# Patient Record
Sex: MALE | URBAN - METROPOLITAN AREA
[De-identification: names, ages, dates, MRNs, and addresses within clinical notes are randomized per-mention and may not be internally consistent; named-entity substitution may affect disease eponyms.]

---

## 2023-12-19 ENCOUNTER — TELEPHONE (OUTPATIENT)
Dept: TRANSPLANT | Facility: CLINIC | Age: 48
End: 2023-12-19

## 2025-01-28 ENCOUNTER — TELEPHONE (OUTPATIENT)
Dept: TRANSPLANT | Facility: CLINIC | Age: 50
End: 2025-01-28

## 2025-02-03 ENCOUNTER — TELEPHONE (OUTPATIENT)
Dept: TRANSPLANT | Facility: CLINIC | Age: 50
End: 2025-02-03
Payer: COMMERCIAL

## 2025-02-19 ENCOUNTER — TELEPHONE (OUTPATIENT)
Dept: TRANSPLANT | Facility: CLINIC | Age: 50
End: 2025-02-19
Payer: COMMERCIAL

## 2025-02-19 DIAGNOSIS — Z76.82 ORGAN TRANSPLANT CANDIDATE: ICD-10-CM

## 2025-02-19 DIAGNOSIS — N18.6 END STAGE RENAL DISEASE: Primary | ICD-10-CM

## 2025-03-07 ENCOUNTER — TELEPHONE (OUTPATIENT)
Dept: TRANSPLANT | Facility: CLINIC | Age: 50
End: 2025-03-07
Payer: COMMERCIAL

## 2025-03-12 ENCOUNTER — TELEPHONE (OUTPATIENT)
Dept: TRANSPLANT | Facility: CLINIC | Age: 50
End: 2025-03-12
Payer: COMMERCIAL

## 2025-03-26 ENCOUNTER — HOSPITAL ENCOUNTER (OUTPATIENT)
Dept: RADIOLOGY | Facility: HOSPITAL | Age: 50
Discharge: HOME | End: 2025-03-26
Attending: NURSE PRACTITIONER
Payer: MEDICARE

## 2025-03-26 ENCOUNTER — TELEPHONE (OUTPATIENT)
Dept: TRANSPLANT | Facility: CLINIC | Age: 50
End: 2025-03-26
Payer: COMMERCIAL

## 2025-03-26 ENCOUNTER — HOSPITAL ENCOUNTER (OUTPATIENT)
Dept: RADIOLOGY | Facility: HOSPITAL | Age: 50
Discharge: HOME | End: 2025-03-26
Payer: MEDICARE

## 2025-03-26 ENCOUNTER — OFFICE VISIT (OUTPATIENT)
Dept: TRANSPLANT | Facility: CLINIC | Age: 50
End: 2025-03-26
Payer: MEDICARE

## 2025-03-26 VITALS
BODY MASS INDEX: 38.91 KG/M2 | SYSTOLIC BLOOD PRESSURE: 157 MMHG | HEART RATE: 70 BPM | WEIGHT: 271.81 LBS | TEMPERATURE: 98 F | RESPIRATION RATE: 20 BRPM | OXYGEN SATURATION: 97 % | HEIGHT: 70 IN | DIASTOLIC BLOOD PRESSURE: 74 MMHG

## 2025-03-26 DIAGNOSIS — I25.10 CORONARY ARTERY DISEASE INVOLVING NATIVE CORONARY ARTERY OF NATIVE HEART WITHOUT ANGINA PECTORIS: ICD-10-CM

## 2025-03-26 DIAGNOSIS — N18.6 HYPERPARATHYROIDISM DUE TO ESRD ON DIALYSIS: ICD-10-CM

## 2025-03-26 DIAGNOSIS — Z99.2 ESRD ON HEMODIALYSIS: ICD-10-CM

## 2025-03-26 DIAGNOSIS — N25.81 HYPERPARATHYROIDISM DUE TO ESRD ON DIALYSIS: ICD-10-CM

## 2025-03-26 DIAGNOSIS — E66.01 CLASS 2 SEVERE OBESITY DUE TO EXCESS CALORIES WITH SERIOUS COMORBIDITY AND BODY MASS INDEX (BMI) OF 39.0 TO 39.9 IN ADULT: ICD-10-CM

## 2025-03-26 DIAGNOSIS — Z01.818 PRE-TRANSPLANT EVALUATION FOR KIDNEY TRANSPLANT: Primary | ICD-10-CM

## 2025-03-26 DIAGNOSIS — R80.9 NEPHROTIC RANGE PROTEINURIA: ICD-10-CM

## 2025-03-26 DIAGNOSIS — N18.6 TYPE 2 DM WITH HYPERTENSION AND ESRD ON DIALYSIS: ICD-10-CM

## 2025-03-26 DIAGNOSIS — Z86.73 HISTORY OF CVA (CEREBROVASCULAR ACCIDENT): ICD-10-CM

## 2025-03-26 DIAGNOSIS — Z71.85 IMMUNIZATION COUNSELING: ICD-10-CM

## 2025-03-26 DIAGNOSIS — E66.812 CLASS 2 SEVERE OBESITY DUE TO EXCESS CALORIES WITH SERIOUS COMORBIDITY AND BODY MASS INDEX (BMI) OF 39.0 TO 39.9 IN ADULT: ICD-10-CM

## 2025-03-26 DIAGNOSIS — Z99.2 HYPERPARATHYROIDISM DUE TO ESRD ON DIALYSIS: ICD-10-CM

## 2025-03-26 DIAGNOSIS — N18.6 ESRD ON HEMODIALYSIS: ICD-10-CM

## 2025-03-26 DIAGNOSIS — I12.0 TYPE 2 DM WITH HYPERTENSION AND ESRD ON DIALYSIS: ICD-10-CM

## 2025-03-26 DIAGNOSIS — Z99.2 TYPE 2 DM WITH HYPERTENSION AND ESRD ON DIALYSIS: ICD-10-CM

## 2025-03-26 DIAGNOSIS — Z76.82 ORGAN TRANSPLANT CANDIDATE: ICD-10-CM

## 2025-03-26 DIAGNOSIS — N18.6 ANEMIA IN ESRD (END-STAGE RENAL DISEASE): ICD-10-CM

## 2025-03-26 DIAGNOSIS — I73.9 PERIPHERAL VASCULAR DISEASE: ICD-10-CM

## 2025-03-26 DIAGNOSIS — I48.91 ATRIAL FIBRILLATION, UNSPECIFIED TYPE: ICD-10-CM

## 2025-03-26 DIAGNOSIS — F32.A DEPRESSION, UNSPECIFIED DEPRESSION TYPE: ICD-10-CM

## 2025-03-26 DIAGNOSIS — E78.49 OTHER HYPERLIPIDEMIA: ICD-10-CM

## 2025-03-26 DIAGNOSIS — N18.6 END STAGE RENAL DISEASE: ICD-10-CM

## 2025-03-26 DIAGNOSIS — E11.22 TYPE 2 DM WITH HYPERTENSION AND ESRD ON DIALYSIS: ICD-10-CM

## 2025-03-26 DIAGNOSIS — D63.1 ANEMIA IN ESRD (END-STAGE RENAL DISEASE): ICD-10-CM

## 2025-03-26 PROBLEM — G47.33 OBSTRUCTIVE SLEEP APNEA: Status: ACTIVE | Noted: 2023-07-17

## 2025-03-26 PROBLEM — E66.9 OBESITY: Status: ACTIVE | Noted: 2025-03-26

## 2025-03-26 PROCEDURE — 93978 VASCULAR STUDY: CPT | Mod: TC,TXP

## 2025-03-26 PROCEDURE — 99204 OFFICE O/P NEW MOD 45 MIN: CPT | Mod: S$PBB,TXP,, | Performed by: STUDENT IN AN ORGANIZED HEALTH CARE EDUCATION/TRAINING PROGRAM

## 2025-03-26 PROCEDURE — 99204 OFFICE O/P NEW MOD 45 MIN: CPT | Mod: S$PBB,TXP,, | Performed by: SURGERY

## 2025-03-26 PROCEDURE — 99999 PR PBB SHADOW E&M-EST. PATIENT-LVL V: CPT | Mod: PBBFAC,TXP,, | Performed by: NURSE PRACTITIONER

## 2025-03-26 PROCEDURE — 72192 CT PELVIS W/O DYE: CPT | Mod: TC,TXP

## 2025-03-26 PROCEDURE — 93978 VASCULAR STUDY: CPT | Mod: 26,TXP,, | Performed by: STUDENT IN AN ORGANIZED HEALTH CARE EDUCATION/TRAINING PROGRAM

## 2025-03-26 PROCEDURE — 71046 X-RAY EXAM CHEST 2 VIEWS: CPT | Mod: 26,TXP,, | Performed by: RADIOLOGY

## 2025-03-26 PROCEDURE — 76770 US EXAM ABDO BACK WALL COMP: CPT | Mod: 26,TXP,, | Performed by: STUDENT IN AN ORGANIZED HEALTH CARE EDUCATION/TRAINING PROGRAM

## 2025-03-26 PROCEDURE — 71046 X-RAY EXAM CHEST 2 VIEWS: CPT | Mod: TC,TXP

## 2025-03-26 PROCEDURE — 76770 US EXAM ABDO BACK WALL COMP: CPT | Mod: TC,TXP

## 2025-03-26 PROCEDURE — 99215 OFFICE O/P EST HI 40 MIN: CPT | Mod: PBBFAC,25,TXP | Performed by: NURSE PRACTITIONER

## 2025-03-26 PROCEDURE — 72192 CT PELVIS W/O DYE: CPT | Mod: 26,TXP,, | Performed by: INTERNAL MEDICINE

## 2025-03-26 RX ORDER — SEVELAMER CARBONATE 800 MG/1
800 TABLET, FILM COATED ORAL
COMMUNITY

## 2025-03-26 RX ORDER — DAPAGLIFLOZIN 10 MG/1
1 TABLET, FILM COATED ORAL DAILY
COMMUNITY

## 2025-03-26 RX ORDER — DILTIAZEM HYDROCHLORIDE 240 MG/1
240 CAPSULE, COATED, EXTENDED RELEASE ORAL DAILY
COMMUNITY

## 2025-03-26 RX ORDER — TAMSULOSIN HYDROCHLORIDE 0.4 MG/1
0.4 CAPSULE ORAL DAILY
COMMUNITY

## 2025-03-26 RX ORDER — TIRZEPATIDE 2.5 MG/.5ML
2.5 INJECTION, SOLUTION SUBCUTANEOUS
COMMUNITY
Start: 2024-11-25

## 2025-03-26 RX ORDER — OMEPRAZOLE 20 MG/1
20 CAPSULE, DELAYED RELEASE ORAL DAILY
COMMUNITY

## 2025-03-26 RX ORDER — APIXABAN 2.5 MG/1
2.5 TABLET, FILM COATED ORAL 2 TIMES DAILY
COMMUNITY
Start: 2024-08-15

## 2025-03-26 RX ORDER — HYDROCODONE BITARTRATE AND ACETAMINOPHEN 7.5; 325 MG/1; MG/1
1 TABLET ORAL EVERY 8 HOURS PRN
COMMUNITY
Start: 2025-03-10

## 2025-03-26 RX ORDER — ISOSORBIDE MONONITRATE 120 MG/1
1 TABLET, EXTENDED RELEASE ORAL DAILY
COMMUNITY

## 2025-03-26 RX ORDER — METOPROLOL TARTRATE 25 MG/1
25 TABLET, FILM COATED ORAL 2 TIMES DAILY
COMMUNITY

## 2025-03-26 RX ORDER — ASPIRIN 81 MG/1
81 TABLET ORAL ONCE
COMMUNITY

## 2025-03-26 RX ORDER — HYDRALAZINE HYDROCHLORIDE 100 MG/1
1 TABLET, FILM COATED ORAL 3 TIMES DAILY
COMMUNITY

## 2025-03-26 NOTE — TELEPHONE ENCOUNTER
MA notes per Adherence form     FOR THE PAST THREE MONTHS:    2-AMA's 25 130 min unknown 25 66 min    0-No-shows    No concerns with care giving, transportation, or mental health    Per adherence form pt rarely misses treatments February was an exception for the patient. Patient is independent and lives alone. Patients adult daughter would be his caregiver if needed.     Scanned in pt's media    Bonnie Fitzgerald  Abdominal Transplant MA

## 2025-03-26 NOTE — PROGRESS NOTES
Transplant Recipient Adult Psychosocial Assessment    Mamadou Ruiz  [address] [facility] [place] [address]  Telephone Information:   Mobile [phone]   Home  [phone] (home)  Work  There is no work phone number on file.  E-mail  [email or web address]    Sex: male  YOB: 1975  Age: 49 y.o.    Encounter Date: 3/26/2025  [place] Citizen: yes  Primary Language: English   Needed: no    Emergency Contact:  Name: Andreea Riuz   Relationship: daughter  Address: [id number] [place] [address] [address]   Phone Numbers:  [phone] (mobile)    Family/Social Support:   Number of dependents/: The patient reported that he does not have any dependents at this time.   Marital history: The patient reported he is a .The patient reported that he loss his wife in 2023.    Other family dynamics: The patient reported that his mother is living and will be able to assist him post transplant recovery.     Household Composition:    The patient reported that he lives alone at this time.     Do you and your caregivers have access to reliable transportation? yes  PRIMARY CAREGIVER: Andreea Ruiz (daughter- 29 y/o)  will be primary caregiver, phone number [phone].      provided in-depth information to patient and caregiver regarding pre- and post-transplant caregiver role.   strongly encourages patient and caregiver to have concrete plan regarding post-transplant care giving, including back-up caregiver(s) to ensure care giving needs are met as needed.    Patient and Caregiver states understanding all aspects of caregiver role/commitment and is able/willing/committed to being caregiver to the fullest extent necessary.    Patient and Caregiver verbalizes understanding of the education provided today and caregiver responsibilities.         remains available. Patient and Caregiver agree to contact  in a timely manner if  concerns arise.      Able to take time off work without financial concerns: yes.     Additional Significant Others who will Assist with Transplant:  Name: Pina Ruiz   Age: 69  City: [facility] State: [place]  Relationship: mother  Does person drive? yes        Living Will: yes- Andreea Sara  Healthcare Power of : yes- Destiness Sara  Advance Directives on file: <<no information> per medical record.  Verbally reviewed LW/HCPA information.   provided patient with copy of LW/HCPA documents and provided education on completion of forms.    Living Donors: No.    Highest Education Level: Associate/Bachelor Degree  Reading Ability: college  Reports difficulty with: N/A  Learns Best By:  Hands-on      Status: no  VA Benefits: no     Working for Income: No  If no, reason not working: Demands of Treatment  The patient reported that he is disabled. The patient reported that the date disability started was 2023 due to him experiencing a stoke.     Spouse/Significant Other Employment: none    Disabled: yes: date disability began: 2023, due to: Stroke.    Monthly Income:   Disability: $2,900  Able to afford all costs now and if transplanted, including medications: yes  Patient and Caregiver verbalizes understanding of personal responsibilities related to transplant costs and the importance of having a financial plan to ensure that patients transplant costs are fully covered.      provided fundraising information/education.  Patient and Caregiver verbalizes understanding.   remains available.    Insurance:   Payer/Plan Subscr  Sex Relation Sub. Ins. ID Effective Group Num   1. BLUE CROSS BL* RICK RUIZ 1975 Male Self [id number]                                   [address] [address]   2. [place]ARE - [place]* RICK RUIZ 1975 Male Self [id number]                                    [address]     Primary Insurance (for UNOS  reporting): Public Insurance - Medicare & Choice  Secondary Insurance (for UNOS reporting): Public Insurance - Medicare & Choice  Patient and Caregiver verbalizes clear understanding that patient may experience difficulty obtaining and/or be denied insurance coverage post-surgery. This includes and is not limited to disability insurance, life insurance, health insurance, burial insurance, long term care insurance, and other insurances.    Patient and Caregiver also reports understanding that future health concerns related to or unrelated to transplantation may not be covered by patient's insurance.  Resources and information provided and reviewed.      Patient and Caregiver provides verbal permission to release any necessary information to outside resources for patient care and discharge planning.  Resources and information provided are reviewed.      Dialysis Adherence:  The patient reported that he has been adhering to his dialysis as scheduled. Compliance form sent to dialysis unit on 3-7-2025 and awaiting a response.       Infusion Service: patient utilizing? no  Home Health: patient utilizing? no  DME: yes- BPC, CPAP, Cane, and thermometer   Pulmonary/Cardiac Rehab: no   ADLS:  The patient reported that he is able to complete his activities of daily living independently.    Adherence:   The patient reported that he adheres to his medication regiment and follow the medical instructions given to him by his physicians..  Adherence education and counseling provided.     Per History Section:  Past Medical History:   Diagnosis Date    Anemia     Atrial fibrillation     CHF (congestive heart failure)     CVA (cerebral vascular accident)     Diabetes mellitus, type 2     Disorder of kidney and ureter     GERD (gastroesophageal reflux disease)     Hyperlipidemia     Hypertension     Obesity     PVD (peripheral vascular disease)      Social History     Tobacco Use    Smoking status: Former     Current packs/day: 0.00      Types: Cigarettes     Quit date: 2015     Years since quitting: 10.2    Smokeless tobacco: Never   Substance Use Topics    Alcohol use: Not on file     Social History     Substance and Sexual Activity   Drug Use Not on file     Social History     Substance and Sexual Activity   Sexual Activity Not on file       Per Today's Psychosocial:  Tobacco:  The patient reported that he smokes a cigar once a year.  .  Alcohol: none, patient denies any use.  Illicit Drugs/Non-prescribed Medications: none, patient denies any use.    Patient and Caregiver states clear understanding of the potential impact of substance use as it relates to transplant candidacy and is aware of possible random substance screening.  Substance abstinence/cessation counseling, education and resources provided and reviewed.     Arrests/DWI/Treatment/Rehab: patient denies    Psychiatric History:    Mental Health:  The patient reported that he has never been diagnosed with a mental health diagnoses.     Psychiatrist/Counselor: The patient reported that he has never seen a psychiatrist. The patient reported that he seen a counselor in 2012.  The patient reported that he only seen the counselor three times because he left like it was getting worst with visiting that provider.     Medications:  The patient denies current or past use of psychotropic medication.     Suicide/Homicide Issues: The patient denies current or past homicidal issues. The patient reported that he has felt suicidal twice in his life. The patient reported that the first time was in 2012 when he loss his daughter. The patient reported that the second time was when his wife past. The patient reported that he felt the same way both times. The patient denies current suicidal issues.     Safety at home: The patient reported that he feels safe at home.     Knowledge: Patient and Caregiver states having clear understanding and realistic expectations regarding the potential risks and potential  benefits of organ transplantation and organ donation, agrees to discuss with health care team members and support system members, and to utilize available resources and express questions and/or concerns in order to further facilitate the pt informed decision-making.  Resources and information provided and reviewed.     Patient and Caregiver is aware of [facility]'s affiliation and/or partnership with agencies in home health care, LTAC, SNF, [facility], and other hospitals and clinics.    Understanding: Patient and Caregiver reports having a clear understanding of the many lifetime commitments involved with being a transplant recipient, including costs, compliance, medications, lab work, procedures, appointments, concrete and financial planning, preparedness, timely and appropriate communication of concerns, abstinence (ETOH, tobacco, illicit non-prescribed drugs), adherence to all health care team recommendations, support system and caregiver involvement, appropriate and timely resource utilization and follow-through, mental health counseling as needed/recommended, and patient and caregiver responsibilities.  Social Service Handbook, resources and detailed educational information provided and reviewed.  Educational information provided.    Patient and Caregiver also reports current and expected compliance with health care regime and states having a clear understanding of the importance of compliance.      Patient and Caregiver reports a clear understanding that risks and benefits may be involved with organ transplantation and with organ donation.      Patient and Caregiver also reports clear understanding that psychosocial risk factors may affect patient, and include but are not limited to feelings of depression, generalized anxiety, anxiety regarding dependence on others, post traumatic stress disorder, feelings of guilt and other emotional and/or mental concerns, and/or exacerbation of existing mental health concerns.   Detailed resources provided and discussed.     Patient and Caregiver agrees to access appropriate resources in a timely manner as needed and/or as recommended, and to communicate concerns appropriately.  Patient and Caregiver also reports a clear understanding of treatment options available.      reviewed education, provided additional information, and answered questions.    Feelings or Concerns: The patient denies any concerns at this time.     Coping: Identify Patient & Caregiver Strategies to [place]:   1. In the past - cooking and supportive family    2. Currently & Pre-transplant - cooking and supportive family    3. At the time of surgery - cooking and supportive family    4. During post-Transplant & Recovery Period - cooking and supportive family     Goals: The patient reported that his going is to be able to function better. The patient reported that he has two grandsons that he would like to go play outside with.   Patient referred to Vocational Rehabilitation.    Interview Behavior: Patient and Caregiver presents as alert and oriented x 4, pleasant, calm, communicative, cooperative, and asking and answering questions appropriately. During this assessment, this SW met with the patient and his primary caregiver-daughter Andreea Ruiz.          Transplant Social Work - Candidacy  Assessment/Plan:     Psychosocial Suitability: Patient presents as  low  to medium risk candidate for kidney transplant at this time based on psychosocial risk factors. The patient has a caregiver plan in place.The patient has adequate insurance coverage at this time.     Recommendations/Additional Comments: This SW recommends [facility] psychiatry for organ transplant clearance .  SW recommends that pt conduct fundraising to assist pt with pay for lodging, cost of medications, food, gas, and other transplant related needs. SW recommends that pt remain aware of potential mental health concerns and contact the team if any concerns  arise. SW recommends that pt remain abstinent from tobacco, ETOH, and drug use. SW supports pt's continued dialysis adherence. SW remains available to answer any questions or concerns that arise as the pt moves through the transplant process    Final determination of transplant candidacy will be reviewed and determined by the selection committee    Isiah Strauss LCSW, Kidney Transplant   [facility]

## 2025-03-26 NOTE — PROGRESS NOTES
INITIAL PATIENT EDUCATION NOTE AA    Mr. Mamadou Ruiz was seen in pre-kidney transplant clinic for evaluation for kidney, kidney/pancreas or pancreas only transplant.  The patient attended an individual video education session that discussed/reviewed the following aspects of transplantation: evaluation including diagnostic and laboratory testing,(Chemistries, Hematology, Serologies including HIV and Hepatitis and HLA) required for transplantation and selection committee process, UNOS waitlist management/multiple listings, types of organs offered (KDPI < 85%, KDPI > 85%, PHS risk, DCD, HCV+, HIV+ for HIV+ recipients and enbloc/dual), financial aspects, surgical procedures, dietary instruction pre- and post-transplant, health maintenance pre- and post-transplant, post-transplant hospitalization and outpatient follow-up, potential to participate in a research protocol, and medication management and side effects.  A question and answer session was provided after the presentation.    The patient was seen by all members of the multi-disciplinary team to include: Nephrologist/MARYANN, Surgeon, , Transplant Coordinator, , Pharmacist and Dietician (if applicable).    The patient reviewed and signed all consents for evaluation which were witnessed and sent to scanning into the [facility] chart.    The patient was given an education book and plan for further evaluation based on his individual assessment.      The Patient was educated on OPTN policy change regarding race based eGFR. For Black or  Americans, this eGFR could have shown that their kidneys were working better than they were.    Because of this change, we are looking at everyones record and assessing waiting time for people who are eligible. We will be reviewing your medical records and will notify you if you are eligible. We also encouraged patient to provide span 20 labs that are not in our electronic medical records    Reviewed  program requirement for complete COVID vaccination with documentation prior to listing.  COVID education information reviewed with patient. Patient encouraged to be up to date on all vaccinations.     The patient was informed that the transplant team would manage immediate post op pain. If the patient requires long term pain management, they will need to have that pain management addressed by their PCP or previous provider who wrote for long term pain medicines.    The patient was encouraged to call with any questions or concerns.

## 2025-03-26 NOTE — PROGRESS NOTES
"   Transplant Nephrology  Kidney Transplant Recipient Evaluation    Referring Physician: Tish Holcomb  Current Nephrologist: Tish Holcomb    Subjective:   CC:  Initial evaluation of kidney transplant candidacy.    HPI:  Mr. Ruiz is a 49 y.o. year old Black or  male who has presented to be evaluated as a potential kidney transplant recipient.  He has ESRD secondary to diabetic nephropathy.  Patient is currently on hemodialysis started on 4/6/24. Patient is dialyzing on T, TH, Sat.  Patient reports that he is tolerating dialysis well.. He has a LUE AV fistula for dialysis access.     Previous Transplant: no  Donor--may have a donor     Body mass index is 39.4 kg/m².  Encourage lifestyle modifications: diet, exercise, weight loss   Needs to maintain acceptable BMI for txp/guidelines     Cardiac HX:  HTN, HLD, CHF, afib., NONISCHEMIC CARDIOMYOPATHY   Has been on Eliquis since having stroke    HX CVA  Residual -mild-moderate left sided weakness    Fx assessment: Uses a cane to walk . He walked from the garage to the clinic without problems, does not appear frail.  Pt reports having falls d/t BP dropping . Last fall ~ 2/2025 --which he describes as "blacking out"  He reports falling and breaking left first finger  He has been instructed to hold BP meds if systolic in< 150   He has a cardiology apt on 4/17/25 with Dr Walker in [place]     DM 2  Dx ~2007  Meds mounjaro  Complications:  + peripheral neuropathy and retinopathy  PVD, LEFT TOE amputation X2   PVD: LLE DOPPLER 6/19/23  SUSPECTED STENOSIS IN THE REGION OF THE DISTAL SUPERFICIAL FEMORAL ARTERY AND POPLITEAL ARTERY   -NOTED TO HAVE MODERATE TO SEVERE PVD DISTAL TO THE POPLITEAL ARTERY  Lab Results   Component Value Date    HGBA1C 8.9 (H) 03/26/2025         Past Medical and Surgical History: Mr. Ruiz  has a past medical history of Anemia, Atrial fibrillation, CHF (congestive heart failure), CVA (cerebral vascular accident), Diabetes " "mellitus, type 2, Disorder of kidney and ureter, GERD (gastroesophageal reflux disease), Hyperlipidemia, Hypertension, Obesity, and PVD (peripheral vascular disease).  He has a past surgical history that includes Adenoidectomy and Toe amputation (Left).    Past Social and Family History: Mr. Ruiz reports that he quit smoking about 10 years ago. His smoking use included cigarettes. He has never used smokeless tobacco. His family history includes Diabetes in his father and sister; Hypertension in his father; Stroke in his father and sister.    Review of Systems   Constitutional:  Positive for fatigue. Negative for activity change, appetite change, chills, fever and unexpected weight change.   HENT:  Negative for congestion, facial swelling, postnasal drip, rhinorrhea, sinus pressure, sore throat and trouble swallowing.    Eyes:  Negative for pain, redness and visual disturbance.   Respiratory:  Negative for cough, chest tightness, shortness of breath and wheezing.    Cardiovascular:  Positive for palpitations (HX AFIB) and leg swelling. Negative for chest pain.   Gastrointestinal:  Negative for abdominal pain, diarrhea, nausea and vomiting.   Genitourinary:  Negative for dysuria, flank pain and urgency.   Musculoskeletal:  Positive for gait problem. Negative for neck pain and neck stiffness.        Uses a cane  for balance    Skin:  Negative for rash.   Allergic/Immunologic: Negative for environmental allergies, food allergies and immunocompromised state.   Neurological:  Positive for weakness (left sided). Negative for dizziness, light-headedness and headaches.        HX CVA   Hematological:  Bruises/bleeds easily (ELIQUIS).   Psychiatric/Behavioral:  Negative for agitation and confusion. The patient is not nervous/anxious.        Objective:   Blood pressure (!) 157/74, pulse 70, temperature 97.7 °F (36.5 °C), temperature source Temporal, resp. rate 20, height 5' 9.65" (1.769 m), weight 123.3 kg (271 lb 13.2 oz), " "SpO2 97%.body mass index is 39.4 kg/m².    Physical Exam  Constitutional:       Appearance: Normal appearance. He is well-developed. He is obese.   HENT:      Head: Normocephalic.      Nose: Nose normal.   Eyes:      Conjunctiva/sclera: Conjunctivae normal.      Pupils: Pupils are equal, round, and reactive to light.   Cardiovascular:      Rate and Rhythm: Normal rate and regular rhythm.      Heart sounds: Normal heart sounds.   Pulmonary:      Effort: Pulmonary effort is normal.      Breath sounds: Normal breath sounds.   Abdominal:      General: Bowel sounds are normal.      Palpations: Abdomen is soft.   Musculoskeletal:      Left hand: Swelling present.        Arms:       Cervical back: Normal range of motion and neck supple.      Right lower leg: Edema present.      Left lower leg: Edema present.      Comments: Bilateral trace peripheral edema   Skin:     General: Skin is warm and dry.   Neurological:      Mental Status: He is alert and oriented to person, place, and time.   Psychiatric:         Behavior: Behavior normal.         Labs:  Lab Results   Component Value Date    WBC 11.11 03/26/2025    HGB 10.7 (L) 03/26/2025    HCT 34.9 (L) 03/26/2025     03/26/2025    K 4.2 03/26/2025     03/26/2025    CO2 23 03/26/2025    BUN 35 (H) 03/26/2025    CREATININE 5.9 (H) 03/26/2025    EGFRNORACEVR 11 (L) 03/26/2025    GLUCOSE 283 (H) 03/26/2025    CALCIUM 8.8 03/26/2025    PHOS 5.3 (H) 03/26/2025    ALBUMIN 3.1 (L) 03/26/2025    AST 11 03/26/2025    ALT 11 03/26/2025    .0 (H) 03/26/2025       No results found for: "PREALBUMIN", "BILIRUBINUA", "GGT", "AMYLASE", "LIPASE", "PROTEINUA", "NITRITE", "RBCUA", "WBCUA"    No results found for: "HLAABCTYPE"    Labs were reviewed with the patient.    Assessment:     1. Pre-transplant evaluation for kidney transplant    2. ESRD on hemodialysis    3. Type 2 DM with hypertension and ESRD on dialysis    4. Coronary artery disease involving native coronary artery " of native heart without angina pectoris    5. Other hyperlipidemia    6. Anemia in ESRD (end-stage renal disease)    7. Atrial fibrillation, unspecified type    8. History of CVA (cerebrovascular accident)    9. Peripheral vascular disease    10. Hyperparathyroidism due to ESRD on dialysis    11. Nephrotic range proteinuria    12. Class 2 severe obesity due to excess calories with serious comorbidity and body mass index (BMI) of 39.0 to 39.9 in adult    13. Immunization counseling        Plan:   Body mass index is 39.4 kg/m².  Encourage lifestyle modifications: diet, exercise, weight loss   Needs to maintain acceptable BMI for txp/guidelines     Cardiac HX:  HTN, HLD, PVD, CHF, afib., NONISCHEMIC CARDIOMYOPATHY , syncopal episode--ON Eliquis  -CARDIOLOGY CLEARANCE   -get BP logs from dialysis   cardiology apt on 4/17/25 with Dr Walker in [place]        Transplant Candidacy:   Based on available information, Mr. Ruiz is a high-risk kidney transplant candidate, d/t co morbidities; CAD, PVD, DM. obesity.   Meets center eligibility for accepting HCV+ donor offer - Yes.  Patient educated on HCV+ donors. Mamadou is willing to accept HCV+ donor offer - Yes   Patient is a candidate for KDPI > 85 kidney donor offer - No d/t weight  Final determination of transplant candidacy will be made once workup is complete and reviewed by the selection committee.    Patient advised that it is recommended that all transplant candidates, and their close contacts and household members receive Covid vaccination.    UNOS Patient Status  Functional Status: 60% - Requires occasional assistance but is able to care for needs     Trina John NP

## 2025-03-26 NOTE — PROGRESS NOTES
Transplant Surgery  Kidney Transplant Recipient Evaluation    Referring Physician: Tish Holcomb  Current Nephrologist: Tish Holcomb    Subjective:     Reason for Visit: evaluate transplant candidacy    History of Present Illness: Mamadou Ruiz is a 49 y.o. year old male undergoing transplant evaluation.    Dialysis History: Mamadou is on hemodialysis.      Transplant History: N/A    Etiology of Renal Disease: Diabetes Mellitus - Type II (based on medical records from referral).    External provider notes reviewed: No    Review of Systems   Constitutional:  Positive for fatigue.   HENT:  Negative for drooling, postnasal drip and sore throat.    Eyes:  Negative for discharge and itching.   Respiratory:  Negative for choking and stridor.    Gastrointestinal:  Negative for rectal pain.   Endocrine: Negative for polydipsia.   Genitourinary:  Negative for enuresis and genital sores.   Musculoskeletal:  Negative for back pain, neck pain and neck stiffness.   Allergic/Immunologic: Negative for immunocompromised state.   Neurological:  Negative for facial asymmetry and numbness.   Hematological:  Negative for adenopathy.   Psychiatric/Behavioral:  Negative for behavioral problems, self-injury and suicidal ideas.    Objective:     Physical Exam:  Constitutional:   Vitals reviewed: yes   Well-nourished and well-groomed: yes  Eyes:   Sclerae icteric: no   Extraocular movements intact: yes  GI:    Bowel sounds normal: yes   Tenderness: no    If yes, quadrant/location: not applicable   Palpable masses: no    If yes, quadrant/location: not applicable   Hepatosplenomegaly: no   Ascites: no   Hernia: no    If yes, type/location: not applicable   Surgical scars: yes    If yes, type/location: laparoscopic port sites  Resp:   Effort normal: yes   Breath sounds normal: yes    CV:   Regular rate and rhythm: yes   Heart sounds normal: yes   Femoral pulses normal: yes   Extremities edematous: no  Skin:   Rashes or lesions present:  no    If yes, describe:not applicable   Jaundice:: no    Musculoskeletal:   Gait normal: yes   Strength normal: yes  Psych:   Oriented to person, place, and time: yes   Affect and mood normal: yes    Additional comments: not applicable    Diagnostics:  The following labs have been reviewed: CBC  CMP    Counseling: We provided Mamadou Ruiz with a group education session today.  We discussed kidney transplantation at length with him, including risks, potential complications, and alternatives in the management of his renal failure.  The discussion included complications related to anesthesia, bleeding, infection, primary nonfunction, and ATN.  I discussed the typical postoperative course, length of hospitalization, the need for long-term immunosuppression, and the need for long-term routine follow-up.  I discussed living-donor and -donor transplantation and the relative advantages and disadvantages of each.  I also discussed average waiting times for both living donation and  donation.  I discussed national and center-specific survival rates.  I also mentioned the potential benefit of multicenter listing to candidates listed with centers within more than one organ procurement organization.  All questions were answered.    Patient advised that it is recommended that all transplant candidates, and their close contacts and household members receive Covid vaccination.    Final determination of transplant candidacy will be made once evaluation is complete and reviewed by the Kidney & Kidney/Pancreas Selection Committee.    Coronavirus disease (COVID-19) caused by severe acute respiratory virus coronavirus 2 (SARS-C0V 2) is associated with increased mortality in solid organ transplant recipients (SOT) compared to non-transplant patients. Vaccine responses to vaccination are depressed against SARS-CoV2 compared to normal individuals but improve with third vaccination doses. Vaccination prior to SOT provides  both the best opportunity for transplant candidates to develop protective immunity and to reduce the risk of serious COVID19 infections post transplantation. Organ transplant candidates at [facility] will be required to receive SARS-CoV-2 vaccination prior to being listed with a an active status, whenever possible. Exceptions will be made for disability related reasons or for sincerely held [employer] beliefs.          Transplant Surgery - Candidacy   Assessment/Plan:   Mamadou Ruiz has end stage renal disease (ESRD) on dialysis. I have concerns with history of peripheral vascular disease - will need bilateral iliac ultrasounds. Based on available information, Mamadou Ruiz is a suitable kidney transplant candidate.     Additional testing to be completed according to the Written Order Guidelines for Adult Pre-kidney and Pancreas Transplant Evaluation (KI-02).  Interpretation of tests and discussion of patient management involves all members of the multidisciplinary transplant team.    Dashawn Casey MD

## 2025-03-26 NOTE — PROGRESS NOTES
PHARM.D. PRE-TRANSPLANT NOTE:    This patient's medication therapy was evaluated as part of his pre-transplant evaluation.      The following general pharmacologic concerns were noted: patient is taking apixiban and aspirin which would increase bleeding risk in the post operative period. He is currently on diltiazem which would need to be taken into account in the post op period as it interacts with tacrolimus.     The following concerns for post-operative pain management were noted: patient has norco for a recent fistula revision but has not been taking many at home    The following pharmacologic concerns related to HCV therapy were noted: coadministration with diltiazem has not been studied       This patient's medication profile was reviewed for considerations for DAA Hepatitis C therapy:    [x]  No current inducers of CYP 3A4 or PGP  [x]  No amiodarone on this patient's EMR profile in the last 24 months  [x]  No past or current atrial fibrillation on this patient's EMR profile       Current Medications[1]        I am available for consultation and can be contacted, as needed by the other members of the Transplant team.            [1]   Current Outpatient Medications   Medication Sig Dispense Refill    aspirin (ECOTRIN) 81 MG EC tablet Take 81 mg by mouth once.      dapagliflozin propanediol (FARXIGA) 10 mg tablet Take 1 tablet by mouth once daily.      diltiaZEM (CARDIZEM CD) 240 MG 24 hr capsule Take 240 mg by mouth once daily.      ELIQUIS 2.5 mg Tab Take 2.5 mg by mouth 2 (two) times daily.      hydrALAZINE (APRESOLINE) 100 MG tablet Take 1 tablet by mouth 3 (three) times daily.      HYDROcodone-acetaminophen (NORCO) 7.5-325 mg per tablet Take 1 tablet by mouth every 8 (eight) hours as needed.      isosorbide mononitrate (IMDUR) 120 MG 24 hr tablet Take 1 tablet by mouth once daily.      metoprolol tartrate (LOPRESSOR) 25 MG tablet Take 25 mg by mouth 2 (two) times daily.      MOUNJARO 2.5 mg/0.5 mL Cody  Inject 2.5 mg into the skin every 7 days.      omeprazole (PRILOSEC) 20 MG capsule Take 20 mg by mouth once daily.      sevelamer carbonate (RENVELA) 800 mg Tab Take 800 mg by mouth 3 (three) times daily with meals.      tamsulosin (FLOMAX) 0.4 mg Cap Take 0.4 mg by mouth once daily.       No current facility-administered medications for this visit.

## 2025-03-26 NOTE — LETTER
March 26, 2025        Tish Holcomb  [address] [place] [address]  Phone: [phone]  Fax: [phone]             [facility]- Transplant 1st Fl  [address]  [facility] [address]  Phone: [phone]   Patient: Mamadou Ruiz   MR Number: [id number]   YOB: 1975   Date of Visit: 3/26/2025       Dear Dr. Tish Holcomb    Thank you for referring Mamadou Ruiz to me for evaluation. Attached you will find relevant portions of my assessment and plan of care.    If you have questions, please do not hesitate to call me. I look forward to following Mamadou Ruiz along with you.    Sincerely,    Trina John, NP    Enclosure    If you would like to receive this communication electronically, please contact [email or web address] or [phone] to request [facility] [employer] access.    [facility] is a tool which provides read-only access to select patient information with whom you have a relationship. Its easy to use and provides real time access to review your patients record including encounter summaries, notes, results, and demographic information.    If you feel you have received this communication in error or would no longer like to receive these types of communications, please e-mail [email or web address]

## 2025-03-26 NOTE — PROGRESS NOTES
PRE-TRANSPLANT INFECTIOUS DISEASE CONSULT    Reason for Visit:  Pre-transplant evaluation  Referring Provider: Aaareferral Self     History of Present Illness:    49 y.o. male with a history of HTN, and ESRD on HD (via LUE AVF) -- presents for pre-kidney transplant evaluation.    Infectious History:  Recent hospital admissions: No  Recent infections: No  Recent or current antibiotic use: Yes, post-op abx for recent sinus / septum surgery 2wks ago, w/o issues.   History of recurrent infections *(sinus / pneumonia / UTI / SBP)*: No  History of diabetic foot wound or bone/joint infection: No  Recent dental infections, issues or procedures: No  History of chicken pox: Yes  History of shingles: No  History of STI: No  History of COVID infection: Yes    History of Immunosuppression:  Prior chemotherapy / immunosuppression: No  Prior transplant: No  History of splenectomy: No    Tuberculosis:  Prior screening for latent TB: Yes  Prior diagnosis of latent TB: No  Risk factors for TB *known exposure, incarceration, homelessness*: No    Geographical exposures:  Currently lives in [place] with self.  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  International travel: Yes, margarita cruises.  Travel-associated illness: Yes    Social/Environmental:  Occupation:  not currently  Pets: No   Livestock: No  Fishing / hunting: No  Hobbies: home life  Water: [place]  Consumption of raw/undercooked meat or seafood?  Yes, raw seafood -- counseled.   Tobacco: No  Alcohol: No  Recreational drug use:  No  Sexual partners: #1 female partner in last 12mo.        Past Histories:   Past Medical History:   Diagnosis Date    Anemia     Atrial fibrillation     CHF (congestive heart failure)     CVA (cerebral vascular accident)     Diabetes mellitus, type 2     Disorder of kidney and ureter     GERD (gastroesophageal reflux disease)     Hyperlipidemia   "   Hypertension     Obesity     PVD (peripheral vascular disease)      Past Surgical History:   Procedure Laterality Date    ADENOIDECTOMY      TOE AMPUTATION Left     x2     Family History   Problem Relation Name Age of Onset    Stroke Father      Hypertension Father      Diabetes Father      Diabetes Sister      Stroke Sister       Social History[1]  Review of patient's allergies indicates:  No Known Allergies      Current antibiotics:  Antibiotics (From admission, onward)      None              Review of Systems  Review of Systems   Constitutional: Negative.   All other systems reviewed and are negative.         Objective  Physical Exam  Vitals reviewed.   Constitutional:       General: He is not in acute distress.     Appearance: Normal appearance. He is normal weight. He is not ill-appearing, toxic-appearing or diaphoretic.   HENT:      Nose: No congestion.   Eyes:      General: No scleral icterus.     Conjunctiva/sclera: Conjunctivae normal.   Cardiovascular:      Rate and Rhythm: Normal rate.   Pulmonary:      Effort: No respiratory distress.   Skin:     General: Skin is warm and dry.      Findings: No rash.   Neurological:      Mental Status: He is alert and oriented to person, place, and time. Mental status is at baseline.   Psychiatric:         Behavior: Behavior normal.         Thought Content: Thought content normal.           Labs:    CBC:   Lab Results   Component Value Date    WBC 11.11 03/26/2025    HGB 10.7 (L) 03/26/2025    HCT 34.9 (L) 03/26/2025    MCV 88 03/26/2025     03/26/2025    LYMPH 12.4 (L) 03/26/2025    LYMPH 1.38 03/26/2025    MONO 7.4 03/26/2025    MONO 0.82 03/26/2025       Syphilis screening:   Lab Results   Component Value Date    TREPABIGMIGG Negative 03/26/2025        TB screening: No results found for: "TBGOLDPLUS", "TSPOTSCREN"    HIV screening:   Lab Results   Component Value Date    RSN72GHAE Non-Reactive 03/26/2025       Strongyloides IgG: No results found for: " ""STRONGANTIGG"    Hepatitis Serologies:   Lab Results   Component Value Date    HEPAIGG Reactive (A) 03/26/2025    HEPBCAB Non-Reactive 03/26/2025    HEPBSAB Non-Reactive 03/26/2025    HEPCAB Non-Reactive 03/26/2025        Varicella IgG: No results found for: "VARICELLAINT"      Immunization History   Administered Date(s) Administered    COVID-19, MRNA, LN-S, PF (Pfizer) (Purple Cap) 05/22/2021, 06/12/2021        Immunization History:  Received all childhood vaccines: Yes  All household members receive annual flu vaccine: Yes  All household members are up to date on COVID vaccine: No    Assessment and Plan    1. Risks of Infection: Available serologies were reviewed. No unusual risks of infection or significant barriers to transplantation were identified from the infectious disease standpoint given the information available at this time.    - Acute infectious issues: None   - Pending serologies: Hepatitis B surface Ag, Quantiferon gold / T-spot, and Strongyloides IgG   - Please call if any pending serologic testing is positive.    2. Immunizations:  Based on the patient's immunization history and serologies, the following immunizations are recommended:  - Hepatitis A    Patient does not have immunity to hepatitis A    Vaccination ordered today: Yes   - Hepatitis B    Patient does not have immunity to hepatitis B    Vaccination ordered today: Yes   - COVID    Current CDC vaccination recommendations were discussed with the patient   - Annual high dose influenza     Vaccination ordered today: No. Reason for not ordering: Vaccination up to date   - Prevnar 20    Vaccination ordered today: Yes   - Tdap    Vaccination ordered today: Yes   - Shingrix    Vaccination ordered today: Yes    Recommended Pre-Transplant Immunization Schedule   Vaccine  0m 1m 2m 6m   Pneumococcal conjugate vaccine (Prevnar 20) X      Tetanus-diphtheria-pertussis (Tdap)* X      Hepatitis A Vaccine (Havrix)** X   X   Hepatitis B Vaccine (Heplisav)** " X X     Influenza (annual) X      Zoster Recombinant Vaccine (Shingrix) X  X           *Administer booster every 10 years.       **Administer if no immunity demonstrated on serologies               Patient will receive vaccines at local pharmacy. A written prescription was provided for all vaccine doses.     3. Counseling:   I discussed with the patient the risk for increased susceptibility to infections following transplantation including increased risk for infection right after transplant and if rejection should occur.  The patient has been counseled on the importance of vaccinations to decrease risk of infection and severe illness. Specific guidance has been provided to the patient regarding the patient's occupation, hobbies and activities to avoid future infectious complications.     4. Transplant Candidacy: Based on available information, there are no identified significant barriers to transplantation from an infectious disease standpoint.  Final determination of transplant candidacy will be made once evaluation is complete and reviewed by the Selection Committee.      Follow up with infectious disease as needed.       The total time for evaluation and management services performed on 03/26/2025 was greater than 30 minutes.              [1]   Social History  Tobacco Use    Smoking status: Former     Current packs/day: 0.00     Types: Cigarettes     Quit date: 2015     Years since quitting: 10.2    Smokeless tobacco: Never

## 2025-03-31 ENCOUNTER — RESULTS FOLLOW-UP (OUTPATIENT)
Dept: TRANSPLANT | Facility: HOSPITAL | Age: 50
End: 2025-03-31

## 2025-04-28 ENCOUNTER — TELEPHONE (OUTPATIENT)
Dept: TRANSPLANT | Facility: CLINIC | Age: 50
End: 2025-04-28
Payer: COMMERCIAL

## 2025-05-08 ENCOUNTER — TELEPHONE (OUTPATIENT)
Dept: TRANSPLANT | Facility: CLINIC | Age: 50
End: 2025-05-08
Payer: COMMERCIAL

## 2025-05-08 ENCOUNTER — TELEPHONE (OUTPATIENT)
Dept: PSYCHIATRY | Facility: CLINIC | Age: 50
End: 2025-05-08
Payer: COMMERCIAL

## 2025-05-08 NOTE — TELEPHONE ENCOUNTER
A call back to pt letting him know that we can schedule him for a colonoscopy at [facility] facility sooner than that. He informed me he would try to call a different doctor to schedule for a sooner appt.    ----- Message from Carey sent at 5/7/2025  1:20 PM CDT -----  Regarding: Patient advice  Contact: [phone]  Name of Caller: Mamadou  Contact Preference:[phone] of Call:  Dr. Nguyen Colonoscopy 01/25/26 is the date offered  will try to get sooner date   Dr. Santos Cardio Stress Test and Echo  06/04/25

## 2025-06-04 ENCOUNTER — DOCUMENTATION ONLY (OUTPATIENT)
Dept: TRANSPLANT | Facility: CLINIC | Age: 50
End: 2025-06-04
Payer: COMMERCIAL

## 2025-06-09 ENCOUNTER — EPISODE CHANGES (OUTPATIENT)
Dept: TRANSPLANT | Facility: CLINIC | Age: 50
End: 2025-06-09

## 2025-06-16 ENCOUNTER — OFFICE VISIT (OUTPATIENT)
Dept: PSYCHIATRY | Facility: CLINIC | Age: 50
End: 2025-06-16
Payer: MEDICARE

## 2025-06-16 VITALS
HEART RATE: 57 BPM | DIASTOLIC BLOOD PRESSURE: 69 MMHG | HEIGHT: 70 IN | SYSTOLIC BLOOD PRESSURE: 134 MMHG | WEIGHT: 269.94 LBS | BODY MASS INDEX: 38.64 KG/M2

## 2025-06-16 DIAGNOSIS — F33.42 MDD (MAJOR DEPRESSIVE DISORDER), RECURRENT, IN FULL REMISSION: Primary | ICD-10-CM

## 2025-06-16 DIAGNOSIS — Z01.818 PRE-TRANSPLANT EVALUATION FOR KIDNEY TRANSPLANT: ICD-10-CM

## 2025-06-16 PROBLEM — F32.A DEPRESSION: Status: ACTIVE | Noted: 2025-06-16

## 2025-06-16 PROCEDURE — 99999 PR PBB SHADOW E&M-EST. PATIENT-LVL III: CPT | Mod: PBBFAC,TXP,, | Performed by: PSYCHIATRY & NEUROLOGY

## 2025-06-16 PROCEDURE — 99213 OFFICE O/P EST LOW 20 MIN: CPT | Mod: PBBFAC,TXP | Performed by: PSYCHIATRY & NEUROLOGY

## 2025-06-16 PROCEDURE — 90792 PSYCH DIAG EVAL W/MED SRVCS: CPT | Mod: NTX,,, | Performed by: PSYCHIATRY & NEUROLOGY

## 2025-06-16 RX ORDER — INSULIN GLARGINE 100 [IU]/ML
INJECTION, SOLUTION SUBCUTANEOUS
COMMUNITY
Start: 2025-05-20
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
## 2025-06-24 ENCOUNTER — TELEPHONE (OUTPATIENT)
Dept: TRANSPLANT | Facility: CLINIC | Age: 50
End: 2025-06-24
Payer: COMMERCIAL

## 2025-06-24 NOTE — TELEPHONE ENCOUNTER
Attempted to return call to Jessica Martinez  in regards to patient's testing. Left detailed message with call back number.

## 2025-07-17 ENCOUNTER — TELEPHONE (OUTPATIENT)
Dept: TRANSPLANT | Facility: CLINIC | Age: 50
End: 2025-07-17
Payer: COMMERCIAL

## 2025-07-17 NOTE — TELEPHONE ENCOUNTER
"Attempted to contact Jessica with [facility] in regards to pts status. Unsuccessful, left detailed message stating we are awaiting his stress, echo and Cardiology clearance. Also, left fax number and call back number.     Copied from CRM #[id number]. Topic: General Inquiry - Patient Advice  >> Jul 17, 2025 10:34 AM Clemente wrote:  Consult/Advisory:        Name Of Caller:  Jessica aMrtinez      Contact Preference?:[phone]     What is the nature of the call?: Calling to speak w/  Renato in regards to an update on the pts status requesting call back       Additional Notes:  "Thank you for all that you do for our patients"  "

## 2025-07-17 NOTE — TELEPHONE ENCOUNTER
Spoke to Jessica Martinez in regards to Stress, Echo and Cardiology clearance. Stress test was rcv'd via fax and advised we need Echo and Clearance. Stated she doesn't know if he had Echo done or not but will check into it and fax it if and when it is completed.

## 2025-07-29 ENCOUNTER — TELEPHONE (OUTPATIENT)
Dept: TRANSPLANT | Facility: CLINIC | Age: 50
End: 2025-07-29
Payer: COMMERCIAL

## 2025-07-29 NOTE — TELEPHONE ENCOUNTER
Spoke to pt in regards to Cardiology clearance. Pt stated he saw Dr. Walker at [facility] in [place], [place]. Records req'd.

## 2025-09-02 ENCOUNTER — TELEPHONE (OUTPATIENT)
Dept: TRANSPLANT | Facility: CLINIC | Age: 50
End: 2025-09-02
Payer: COMMERCIAL

## 2025-09-04 ENCOUNTER — TELEPHONE (OUTPATIENT)
Dept: TRANSPLANT | Facility: CLINIC | Age: 50
End: 2025-09-04
Payer: COMMERCIAL